# Patient Record
Sex: MALE | Race: WHITE | NOT HISPANIC OR LATINO | URBAN - METROPOLITAN AREA
[De-identification: names, ages, dates, MRNs, and addresses within clinical notes are randomized per-mention and may not be internally consistent; named-entity substitution may affect disease eponyms.]

---

## 2017-02-26 ENCOUNTER — EMERGENCY (EMERGENCY)
Facility: HOSPITAL | Age: 62
LOS: 1 days | Discharge: HOME | End: 2017-02-26
Attending: EMERGENCY MEDICINE | Admitting: EMERGENCY MEDICINE
Payer: COMMERCIAL

## 2017-02-26 VITALS
OXYGEN SATURATION: 99 % | SYSTOLIC BLOOD PRESSURE: 126 MMHG | HEART RATE: 72 BPM | RESPIRATION RATE: 16 BRPM | TEMPERATURE: 98 F | DIASTOLIC BLOOD PRESSURE: 78 MMHG

## 2017-02-26 VITALS
HEART RATE: 70 BPM | DIASTOLIC BLOOD PRESSURE: 85 MMHG | TEMPERATURE: 98 F | RESPIRATION RATE: 14 BRPM | SYSTOLIC BLOOD PRESSURE: 127 MMHG | OXYGEN SATURATION: 97 % | WEIGHT: 199.96 LBS

## 2017-02-26 DIAGNOSIS — Z88.1 ALLERGY STATUS TO OTHER ANTIBIOTIC AGENTS STATUS: ICD-10-CM

## 2017-02-26 DIAGNOSIS — Z88.5 ALLERGY STATUS TO NARCOTIC AGENT: ICD-10-CM

## 2017-02-26 DIAGNOSIS — L03.116 CELLULITIS OF LEFT LOWER LIMB: ICD-10-CM

## 2017-02-26 DIAGNOSIS — M79.662 PAIN IN LEFT LOWER LEG: ICD-10-CM

## 2017-02-26 PROCEDURE — 99283 EMERGENCY DEPT VISIT LOW MDM: CPT

## 2017-02-26 PROCEDURE — 99284 EMERGENCY DEPT VISIT MOD MDM: CPT

## 2017-02-26 RX ORDER — AZTREONAM 2 G
1 VIAL (EA) INJECTION
Qty: 20 | Refills: 0 | OUTPATIENT
Start: 2017-02-26 | End: 2017-03-08

## 2017-02-26 RX ADMIN — Medication 1 TABLET(S): at 11:41

## 2017-02-26 NOTE — ED PROVIDER NOTE - CHPI ED SYMPTOMS NEG
no drainage/no bleeding/no bleeding at site/no red streaks/no fever/no vomiting/no chills/no purulent drainage

## 2017-02-26 NOTE — ED ADULT NURSE NOTE - OBJECTIVE STATEMENT
Pt alert and oriented, came to ED with minor 1mm wound site to LLE located just below knee, pt reports "banging" it, site red, warm, tender to touch, afebrile, labs drawn and sent, advised pt and family on plan of care/hand hygiene, verbalized understanding.

## 2017-02-26 NOTE — ED PROVIDER NOTE - ATTENDING CONTRIBUTION TO CARE
I have personally performed a face to face diagnostic evaluation on this patient.  I have reviewed the PA note and agree with the history, exam, and plan of care, except as noted.  History and Exam by me shows  left lower leg redness and pain x 3 days. pe- 1/2 dollar size cellulitis below left knee, red, tender and swelling. no fluctance.

## 2017-02-26 NOTE — ED PROVIDER NOTE - OBJECTIVE STATEMENT
62 y/o male presents to ED with localized redness to left lower extremity x 3 days. States he hit his leg against something and then later noticed the redness and had some pain localized to area. Denies any other complaints. States he otherwise feels good. Denies n/v, f/c, chest pain, sob, numbness, tingling, claudication, calf pain.

## 2017-02-26 NOTE — ED PROVIDER NOTE - MEDICAL DECISION MAKING DETAILS
Please follow up with your Primary MD in 24 hr for wound check. Bactrim twice a day x 10 days. Take a daily probiotic. Watch for worsening signs of infection such as increased redness, warmth, swelling, discharge, pain. Return to ED immediately if condition worsens or development of nausea, vomiting, fever, chills, diarrhea.  Seek immediate medical care for any new/worsening signs or symptoms.

## 2017-02-26 NOTE — ED PROVIDER NOTE - SKIN WOUND TYPE
3cm x 3cm localized redness to anterior left proximal shin, +mild localized tenderness. No bony tenderness.

## 2017-02-26 NOTE — ED PROVIDER NOTE - PHYSICAL EXAMINATION
left lower extremity- 3cm x 3cm localized redness to anterior left proximal shin, +mild localized tenderness. No bony tenderness. No streaking, no warmth, no drainage, no swelling. No calf tenderness BL. NVI, good distal pulses BL, capillary refill <2 sec BL, sensation intact throughout, 5/5 motor x 4 extremities.

## 2017-03-01 ENCOUNTER — EMERGENCY (EMERGENCY)
Facility: HOSPITAL | Age: 62
LOS: 1 days | Discharge: HOME | End: 2017-03-01
Attending: EMERGENCY MEDICINE | Admitting: EMERGENCY MEDICINE
Payer: COMMERCIAL

## 2017-03-01 VITALS
DIASTOLIC BLOOD PRESSURE: 78 MMHG | OXYGEN SATURATION: 95 % | HEART RATE: 69 BPM | RESPIRATION RATE: 16 BRPM | TEMPERATURE: 98 F | WEIGHT: 199.96 LBS | SYSTOLIC BLOOD PRESSURE: 137 MMHG

## 2017-03-01 VITALS
SYSTOLIC BLOOD PRESSURE: 116 MMHG | DIASTOLIC BLOOD PRESSURE: 79 MMHG | TEMPERATURE: 98 F | HEART RATE: 63 BPM | RESPIRATION RATE: 18 BRPM | OXYGEN SATURATION: 99 %

## 2017-03-01 DIAGNOSIS — L02.416 CUTANEOUS ABSCESS OF LEFT LOWER LIMB: ICD-10-CM

## 2017-03-01 DIAGNOSIS — Z88.5 ALLERGY STATUS TO NARCOTIC AGENT: ICD-10-CM

## 2017-03-01 DIAGNOSIS — Z79.2 LONG TERM (CURRENT) USE OF ANTIBIOTICS: ICD-10-CM

## 2017-03-01 DIAGNOSIS — Z88.1 ALLERGY STATUS TO OTHER ANTIBIOTIC AGENTS STATUS: ICD-10-CM

## 2017-03-01 PROBLEM — Z00.00 ENCOUNTER FOR PREVENTIVE HEALTH EXAMINATION: Status: ACTIVE | Noted: 2017-03-01

## 2017-03-01 LAB
ALBUMIN SERPL ELPH-MCNC: 3.5 G/DL — SIGNIFICANT CHANGE UP (ref 3.3–5)
ALP SERPL-CCNC: 71 U/L — SIGNIFICANT CHANGE UP (ref 40–120)
ALT FLD-CCNC: 21 U/L — SIGNIFICANT CHANGE UP (ref 12–78)
ANION GAP SERPL CALC-SCNC: 8 MMOL/L — SIGNIFICANT CHANGE UP (ref 5–17)
AST SERPL-CCNC: 21 U/L — SIGNIFICANT CHANGE UP (ref 15–37)
BASOPHILS # BLD AUTO: 0.1 K/UL — SIGNIFICANT CHANGE UP (ref 0–0.2)
BASOPHILS NFR BLD AUTO: 1.4 % — SIGNIFICANT CHANGE UP (ref 0–2)
BILIRUB SERPL-MCNC: 0.3 MG/DL — SIGNIFICANT CHANGE UP (ref 0.2–1.2)
BUN SERPL-MCNC: 18 MG/DL — SIGNIFICANT CHANGE UP (ref 7–23)
CALCIUM SERPL-MCNC: 8.5 MG/DL — SIGNIFICANT CHANGE UP (ref 8.5–10.1)
CHLORIDE SERPL-SCNC: 108 MMOL/L — SIGNIFICANT CHANGE UP (ref 96–108)
CO2 SERPL-SCNC: 24 MMOL/L — SIGNIFICANT CHANGE UP (ref 22–31)
CREAT SERPL-MCNC: 0.93 MG/DL — SIGNIFICANT CHANGE UP (ref 0.5–1.3)
EOSINOPHIL # BLD AUTO: 0.6 K/UL — HIGH (ref 0–0.5)
EOSINOPHIL NFR BLD AUTO: 7.8 % — HIGH (ref 0–6)
GLUCOSE SERPL-MCNC: 100 MG/DL — HIGH (ref 70–99)
HCT VFR BLD CALC: 42.2 % — SIGNIFICANT CHANGE UP (ref 39–50)
HGB BLD-MCNC: 14.2 G/DL — SIGNIFICANT CHANGE UP (ref 13–17)
LYMPHOCYTES # BLD AUTO: 1.5 K/UL — SIGNIFICANT CHANGE UP (ref 1–3.3)
LYMPHOCYTES # BLD AUTO: 19.4 % — SIGNIFICANT CHANGE UP (ref 13–44)
MCHC RBC-ENTMCNC: 30.2 PG — SIGNIFICANT CHANGE UP (ref 27–34)
MCHC RBC-ENTMCNC: 33.8 GM/DL — SIGNIFICANT CHANGE UP (ref 32–36)
MCV RBC AUTO: 89.5 FL — SIGNIFICANT CHANGE UP (ref 80–100)
MONOCYTES # BLD AUTO: 0.6 K/UL — SIGNIFICANT CHANGE UP (ref 0–0.9)
MONOCYTES NFR BLD AUTO: 7.8 % — SIGNIFICANT CHANGE UP (ref 1–9)
NEUTROPHILS # BLD AUTO: 4.8 K/UL — SIGNIFICANT CHANGE UP (ref 1.8–7.4)
NEUTROPHILS NFR BLD AUTO: 63.6 % — SIGNIFICANT CHANGE UP (ref 43–77)
PLATELET # BLD AUTO: 122 K/UL — LOW (ref 150–400)
POTASSIUM SERPL-MCNC: 4.5 MMOL/L — SIGNIFICANT CHANGE UP (ref 3.5–5.3)
POTASSIUM SERPL-SCNC: 4.5 MMOL/L — SIGNIFICANT CHANGE UP (ref 3.5–5.3)
PROT SERPL-MCNC: 7 G/DL — SIGNIFICANT CHANGE UP (ref 6–8.3)
RBC # BLD: 4.71 M/UL — SIGNIFICANT CHANGE UP (ref 4.2–5.8)
RBC # FLD: 11.9 % — SIGNIFICANT CHANGE UP (ref 10.3–14.5)
SODIUM SERPL-SCNC: 140 MMOL/L — SIGNIFICANT CHANGE UP (ref 135–145)
WBC # BLD: 7.6 K/UL — SIGNIFICANT CHANGE UP (ref 3.8–10.5)
WBC # FLD AUTO: 7.6 K/UL — SIGNIFICANT CHANGE UP (ref 3.8–10.5)

## 2017-03-01 PROCEDURE — 87070 CULTURE OTHR SPECIMN AEROBIC: CPT

## 2017-03-01 PROCEDURE — 85027 COMPLETE CBC AUTOMATED: CPT

## 2017-03-01 PROCEDURE — 96374 THER/PROPH/DIAG INJ IV PUSH: CPT

## 2017-03-01 PROCEDURE — 80053 COMPREHEN METABOLIC PANEL: CPT

## 2017-03-01 PROCEDURE — 87186 SC STD MICRODIL/AGAR DIL: CPT

## 2017-03-01 PROCEDURE — 99284 EMERGENCY DEPT VISIT MOD MDM: CPT | Mod: 25

## 2017-03-01 PROCEDURE — 99284 EMERGENCY DEPT VISIT MOD MDM: CPT

## 2017-03-01 RX ORDER — MORPHINE SULFATE 50 MG/1
4 CAPSULE, EXTENDED RELEASE ORAL ONCE
Qty: 0 | Refills: 0 | Status: DISCONTINUED | OUTPATIENT
Start: 2017-03-01 | End: 2017-03-01

## 2017-03-01 RX ORDER — CEPHALEXIN 500 MG
500 CAPSULE ORAL ONCE
Qty: 0 | Refills: 0 | Status: COMPLETED | OUTPATIENT
Start: 2017-03-01 | End: 2017-03-01

## 2017-03-01 RX ORDER — CEPHALEXIN 500 MG
1 CAPSULE ORAL
Qty: 40 | Refills: 0 | OUTPATIENT
Start: 2017-03-01 | End: 2017-03-11

## 2017-03-01 RX ADMIN — Medication 500 MILLIGRAM(S): at 11:39

## 2017-03-01 RX ADMIN — Medication 300 MILLIGRAM(S): at 11:41

## 2017-03-01 RX ADMIN — MORPHINE SULFATE 4 MILLIGRAM(S): 50 CAPSULE, EXTENDED RELEASE ORAL at 11:43

## 2017-03-01 NOTE — ED PROVIDER NOTE - MEDICAL DECISION MAKING DETAILS
pt with known cellulitis/wound from trauma with worsening symptoms after bactrim.  labs, change antibiotics, wound culture

## 2017-03-01 NOTE — ED PROVIDER NOTE - OBJECTIVE STATEMENT
60 y/o M with recent hx of wound infection from trauma placed on Bactrim with worsening symptoms.  pt states that he hit his leg a few days ago and he began to have redness and swelling.  In the ED pt was placed on Bactrim and told to return in 48 hours for a wound check.  pt denies fevers, chills, nausea, sick contacts or recent travel.

## 2017-03-03 LAB
-  AMPICILLIN/SULBACTAM: SIGNIFICANT CHANGE UP
-  CEFAZOLIN: SIGNIFICANT CHANGE UP
-  CIPROFLOXACIN: SIGNIFICANT CHANGE UP
-  CLINDAMYCIN: SIGNIFICANT CHANGE UP
-  ERYTHROMYCIN: SIGNIFICANT CHANGE UP
-  GENTAMICIN: SIGNIFICANT CHANGE UP
-  LEVOFLOXACIN: SIGNIFICANT CHANGE UP
-  MOXIFLOXACIN(AEROBIC): SIGNIFICANT CHANGE UP
-  OXACILLIN: SIGNIFICANT CHANGE UP
-  RIFAMPIN: SIGNIFICANT CHANGE UP
-  TETRACYCLINE: SIGNIFICANT CHANGE UP
-  TRIMETHOPRIM/SULFAMETHOXAZOLE: SIGNIFICANT CHANGE UP
-  VANCOMYCIN: SIGNIFICANT CHANGE UP
CULTURE RESULTS: SIGNIFICANT CHANGE UP
METHOD TYPE: SIGNIFICANT CHANGE UP
ORGANISM # SPEC MICROSCOPIC CNT: SIGNIFICANT CHANGE UP
ORGANISM # SPEC MICROSCOPIC CNT: SIGNIFICANT CHANGE UP
SPECIMEN SOURCE: SIGNIFICANT CHANGE UP

## 2019-11-14 NOTE — ED PROVIDER NOTE - TEMPLATE
Subjective     William Bolton is a 39 year old male who presents to clinic today for the following health issues:    HPI   Hypertension Follow-up      Do you check your blood pressure regularly outside of the clinic? Yes but not recently    Are you following a low salt diet? Yes    Are your blood pressures ever more than 140 on the top number (systolic) OR more   than 90 on the bottom number (diastolic), for example 140/90? No    Didn't take antihypertensive medication this morning.  Takes medication at night.     Started intermittent fasting 1 month ago.   20 lb weight loss since May 2019.    Wt Readings from Last 4 Encounters:   11/14/19 113.4 kg (250 lb)   05/30/19 123.4 kg (272 lb)   05/16/19 124.7 kg (275 lb)   12/27/18 119.3 kg (263 lb)         BP Readings from Last 5 Encounters:   11/14/19 (!) 144/84   08/06/19 130/86   07/19/19 150/82   06/13/19 108/64   06/12/19 142/82         How many servings of fruits and vegetables do you eat daily?  2-3    On average, how many sweetened beverages do you drink each day (soda, juice, sweet tea, etc)?   0-1    How many days per week do you miss taking your medication? 0        +Hyperhidrosis history in axilla, back and intermittently in leg area.        Patient Active Problem List   Diagnosis     Mixed hyperlipidemia     ADD (attention deficit disorder) without hyperactivity     Major depressive disorder, recurrent episode, mild (H)     Non morbid obesity due to excess calories     Abnormal bone xray - sclerosis of phalynx of right great toe 7/31/17 - suggest repeat imaging at annual visit.     NATHAN (obstructive sleep apnea)     Nocturnal hypoxemia     Anxiety     Obesity (BMI 35.0-39.9) with comorbidity (H)     Adenomatous polyp of descending colon     Essential hypertension with goal blood pressure less than 130/80     Past Surgical History:   Procedure Laterality Date     COLONOSCOPY  06/13/2019    Dr. Ramon LAZARO     ESOPHAGOSCOPY, GASTROSCOPY, DUODENOSCOPY (EGD),  "COMBINED N/A 5/23/2019    Procedure: ESOPHAGOGASTRODUODENOSCOPY, WITH BIOPSY;  Surgeon: Say Navarro MD;  Location:  GI     HEAD & NECK SURGERY      wisdom teeth removed     SURGICAL HISTORY OF -          Social History     Tobacco Use     Smoking status: Never Smoker     Smokeless tobacco: Never Used   Substance Use Topics     Alcohol use: Yes     Comment: 3 x week - 2 drinks     Family History   Problem Relation Age of Onset     Coronary Artery Disease Paternal Grandfather      Hypertension Father      Other - See Comments Father         primary sclerosing cholangitis     Hyperlipidemia Mother      Hypertension Sister      Colon Cancer Cousin          Current Outpatient Medications   Medication Sig Dispense Refill     aluminum chloride (DRYSOL) 20 % external solution Apply topically At Bedtime Apply once daily at bedtime; once excessive sweating has stopped, may decrease to once or twice weekly, or as needed. Wash treated area in the morning. 60 mL 3     B-D LUER-DAVID SYRINGE 23G X 1\" 3 ML MISC U WITH INJECTIONS  2     iron (FERROUS GLUCONATE) 256 (28 Fe) MG tablet Take 1 tablet (100 mg) by mouth daily 90 tablet 0     lisinopril-hydrochlorothiazide (PRINZIDE/ZESTORETIC) 20-25 MG tablet Take 1 tablet by mouth daily 90 tablet 1     testosterone cypionate (DEPOTESTOSTERONE) 200 MG/ML injection INJECT 0.8ML INTO THE MUSCLE ONCE EVERY WEEK  1     No Known Allergies    Reviewed and updated as needed this visit by Provider         Review of Systems   ROS COMP: Constitutional, HEENT, cardiovascular, pulmonary, gi and gu systems are negative, except as otherwise noted.      Objective    BP (!) 144/84   Pulse 73   Temp 97.5  F (36.4  C) (Oral)   Ht 1.829 m (6')   Wt 113.4 kg (250 lb)   SpO2 98%   BMI 33.91 kg/m    Body mass index is 33.91 kg/m .  Physical Exam   GENERAL: healthy, alert and no distress  RESP: lungs clear to auscultation - no rales, rhonchi or wheezes  CV: regular rate and rhythm, normal S1 S2, " no S3 or S4, no murmur, click or rub, no peripheral edema  PSYCH: mentation appears normal, affect normal/bright        Assessment & Plan     William was seen today for hypertension.    Diagnoses and all orders for this visit:    Essential hypertension with goal blood pressure less than 130/80  Not currently in range, will have patient follow-up for BP recheck.    -     Basic metabolic panel  (Ca, Cl, CO2, Creat, Gluc, K, Na, BUN)  -     lisinopril-hydrochlorothiazide (PRINZIDE/ZESTORETIC) 20-25 MG tablet; Take 1 tablet by mouth daily.  If continued elevated blood pressure would consider increase in Lisinopril versus addition of 3rd antihypertensive.    -     Follow-up for nurse only BP recheck in 1 month.  Encouraged patient to check home blood pressures and bring to follow-up appointment.      Mixed hyperlipidemia  Recent Labs   Lab Test 07/17/18  0908   CHOL 351*   HDL 62   *   TRIG 157*   Due for recheck.  Discussed if continued elevated LDL, would recommend statin therapy.    -     Lipid panel reflex to direct LDL Fasting    Hyperhidrosis  -     aluminum chloride (DRYSOL) 20 % external solution; Apply topically At Bedtime Apply once daily at bedtime; once excessive sweating has stopped, may decrease to once or twice weekly, or as needed. Wash treated area in the morning.  -     TSH with free T4 reflex           BMI:   Estimated body mass index is 33.91 kg/m  as calculated from the following:    Height as of this encounter: 1.829 m (6').    Weight as of this encounter: 113.4 kg (250 lb).   Weight management plan: Discussed healthy diet and exercise guidelines          Return in about 1 month (around 12/14/2019) for nurse only BP recheck.    ODALIS Martin [facility]     Wounds

## 2021-03-05 ENCOUNTER — APPOINTMENT (OUTPATIENT)
Dept: PULMONOLOGY | Facility: CLINIC | Age: 66
End: 2021-03-05
Payer: COMMERCIAL

## 2021-03-05 DIAGNOSIS — U07.1 COVID-19: ICD-10-CM

## 2021-03-05 DIAGNOSIS — J12.82 COVID-19: ICD-10-CM

## 2021-03-05 PROCEDURE — 99202 OFFICE O/P NEW SF 15 MIN: CPT | Mod: 95

## 2021-03-05 RX ORDER — DEXAMETHASONE 6 MG/1
6 TABLET ORAL DAILY
Qty: 10 | Refills: 0 | Status: ACTIVE | COMMUNITY
Start: 2021-03-05 | End: 1900-01-01

## 2021-03-05 NOTE — PLAN
[FreeTextEntry1] : 65 year old male with COVID pneumonia presents for an initial evaluation.  Will start decadron 6 mg for 10 days, also will initiate IV hydration at home as well as home blood draw.  Will also initiate IV remdesivir at home.  Will follow up on Monday. \par \par \par I interviewed the patient and developed a plan of care  Paco Brito MD\par

## 2021-03-05 NOTE — HISTORY OF PRESENT ILLNESS
[Home] : at home, [unfilled] , at the time of the visit. [Medical Office: ([facility])___] : at the medical office located in  [Spouse] : spouse [Verbal consent obtained from patient] : the patient, [unfilled] [FreeTextEntry1] : 65 year old male with COVID pneumonia presents for an initial evaluation.  He tested positive 2 days ago (3/3) and has had mostly GI symptoms.  He had diarrhea today and has not been taking in much fluid; he feels dehydrated and is not urinating much.  He has also been experiencing fevers; last fever 101.2 he has been alternating between tylenol and advil for the fevers.  \par His oxygen saturation on room air is 95% at rest but drops to 92% with exertion.  He denies feeling short of breath.  His pulse rate also increased at the time of his O2 dropping. \par [facility]

## 2021-03-06 ENCOUNTER — NON-APPOINTMENT (OUTPATIENT)
Age: 66
End: 2021-03-06

## 2021-03-08 ENCOUNTER — APPOINTMENT (OUTPATIENT)
Dept: PULMONOLOGY | Facility: CLINIC | Age: 66
End: 2021-03-08
Payer: COMMERCIAL

## 2021-03-08 DIAGNOSIS — U07.1 COVID-19: ICD-10-CM

## 2021-03-08 PROCEDURE — 99212 OFFICE O/P EST SF 10 MIN: CPT | Mod: 95

## 2021-03-08 NOTE — HISTORY OF PRESENT ILLNESS
[Home] : at home, [unfilled] , at the time of the visit. [Medical Office: ([facility])___] : at the medical office located in  [Spouse] : spouse [Verbal consent obtained from patient] : the patient, [unfilled] [FreeTextEntry1] : 65 year old male with COVID pneumonia presents for a follow up. He currently states he feels significantly better compared to last Friday when we first spoke.  He received 1 L of IV fluids and is also currently on remdesivir as well as decadron.  His oxygen saturations are around 96% on room air at rest and on exertion. He will follow up in about 8 weeks for repeat imagin

## 2021-03-08 NOTE — PLAN
[FreeTextEntry1] : 65 year old male with COVID pneumonia presents for a follow up.He will follow up in about 8 weeks for repeat imaging and PFTs. He will also start taking a baby ASA.  Will continue to trend labs including d dimer while on remidsivir.  \par \par Follow up before as needed\par \par I interviewed the patient and developed a plan of care  Paco Brito MD\par

## 2021-10-06 PROBLEM — U07.1 PNEUMONIA DUE TO COVID-19 VIRUS: Status: ACTIVE | Noted: 2021-03-05

## 2024-05-22 ENCOUNTER — APPOINTMENT (OUTPATIENT)
Dept: ORTHOPEDIC SURGERY | Facility: CLINIC | Age: 69
End: 2024-05-22
Payer: MEDICARE

## 2024-05-22 ENCOUNTER — RESULT CHARGE (OUTPATIENT)
Age: 69
End: 2024-05-22

## 2024-05-22 VITALS — HEIGHT: 72 IN | BODY MASS INDEX: 27.09 KG/M2 | WEIGHT: 200 LBS

## 2024-05-22 DIAGNOSIS — M75.01 ADHESIVE CAPSULITIS OF RIGHT SHOULDER: ICD-10-CM

## 2024-05-22 DIAGNOSIS — Z78.9 OTHER SPECIFIED HEALTH STATUS: ICD-10-CM

## 2024-05-22 PROCEDURE — 99204 OFFICE O/P NEW MOD 45 MIN: CPT

## 2024-05-22 RX ORDER — NAPROXEN 500 MG/1
500 TABLET ORAL
Qty: 60 | Refills: 0 | Status: ACTIVE | COMMUNITY
Start: 2024-05-22 | End: 1900-01-01

## 2024-05-22 NOTE — HISTORY OF PRESENT ILLNESS
[7] : 7 [0] : 0 [Sharp] : sharp [Intermittent] : intermittent [Leisure] : leisure [Work] : work [Full time] : Work status: full time [de-identified] : Right shoulder pain for 2 months after he took a suitcase out of his car. He went golfing that day which worsened it.  [] : no [FreeTextEntry1] : Right shoulder [FreeTextEntry9] : Advil

## 2024-05-22 NOTE — REASON FOR VISIT
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 He golfed later that day which increased his pain. No prior history/treatment. Reaching is painful. Night symptoms can occur. There is no n/t. NSAID use as needed with temporary relief.

## 2024-05-22 NOTE — ASSESSMENT
[FreeTextEntry1] : We discussed the underlying pathology.  Treatment options reviewed.  Naprosyn is prescribed.  PT is planned.  He was advised to get a primary care physician.  Cautions discussed.  Questions answered.  Patient seen by Elias Choe M.D. Entered by Antoinette Reeves acting as scribe.

## 2024-05-22 NOTE — IMAGING
[Right] : right shoulder [FreeTextEntry1] : The joints are ok.  [FreeTextEntry5] : There is a type I acromion.

## 2024-07-10 ENCOUNTER — APPOINTMENT (OUTPATIENT)
Dept: ORTHOPEDIC SURGERY | Facility: CLINIC | Age: 69
End: 2024-07-10